# Patient Record
Sex: FEMALE | Race: WHITE | NOT HISPANIC OR LATINO | URBAN - METROPOLITAN AREA
[De-identification: names, ages, dates, MRNs, and addresses within clinical notes are randomized per-mention and may not be internally consistent; named-entity substitution may affect disease eponyms.]

---

## 2018-04-06 ENCOUNTER — OFFICE VISIT CONVERTED (OUTPATIENT)
Dept: FAMILY MEDICINE CLINIC | Facility: CLINIC | Age: 33
End: 2018-04-06
Attending: NURSE PRACTITIONER

## 2018-04-06 ENCOUNTER — CONVERSION ENCOUNTER (OUTPATIENT)
Dept: FAMILY MEDICINE CLINIC | Facility: CLINIC | Age: 33
End: 2018-04-06

## 2018-05-18 ENCOUNTER — OFFICE VISIT CONVERTED (OUTPATIENT)
Dept: FAMILY MEDICINE CLINIC | Facility: CLINIC | Age: 33
End: 2018-05-18
Attending: NURSE PRACTITIONER

## 2020-06-16 ENCOUNTER — OFFICE VISIT CONVERTED (OUTPATIENT)
Dept: FAMILY MEDICINE CLINIC | Facility: CLINIC | Age: 35
End: 2020-06-16
Attending: NURSE PRACTITIONER

## 2020-06-16 ENCOUNTER — HOSPITAL ENCOUNTER (OUTPATIENT)
Dept: FAMILY MEDICINE CLINIC | Facility: CLINIC | Age: 35
Discharge: HOME | End: 2020-06-16
Attending: NURSE PRACTITIONER

## 2020-06-16 LAB
25(OH)D3 SERPL-MCNC: 35.4 NG/ML (ref 30–100)
ALBUMIN SERPL-MCNC: 4.5 G/DL (ref 3.5–5)
ALBUMIN/GLOB SERPL: 1.4 {RATIO} (ref 1.4–2.6)
ALP SERPL-CCNC: 86 U/L (ref 42–98)
ALT SERPL-CCNC: 14 U/L (ref 10–40)
ANION GAP SERPL CALC-SCNC: 17 MMOL/L (ref 8–19)
AST SERPL-CCNC: 18 U/L (ref 15–50)
BASOPHILS # BLD AUTO: 0.09 10*3/UL (ref 0–0.2)
BASOPHILS NFR BLD AUTO: 1.4 % (ref 0–3)
BILIRUB SERPL-MCNC: 0.31 MG/DL (ref 0.2–1.3)
BUN SERPL-MCNC: 10 MG/DL (ref 5–25)
BUN/CREAT SERPL: 15 {RATIO} (ref 6–20)
CALCIUM SERPL-MCNC: 9.8 MG/DL (ref 8.7–10.4)
CHLORIDE SERPL-SCNC: 102 MMOL/L (ref 99–111)
CHOLEST SERPL-MCNC: 180 MG/DL (ref 107–200)
CHOLEST/HDLC SERPL: 3.7 {RATIO} (ref 3–6)
CONV ABS IMM GRAN: 0.01 10*3/UL (ref 0–0.2)
CONV CO2: 24 MMOL/L (ref 22–32)
CONV IMMATURE GRAN: 0.2 % (ref 0–1.8)
CONV TOTAL PROTEIN: 7.7 G/DL (ref 6.3–8.2)
CREAT UR-MCNC: 0.68 MG/DL (ref 0.5–0.9)
DEPRECATED RDW RBC AUTO: 42.2 FL (ref 36.4–46.3)
EOSINOPHIL # BLD AUTO: 0.08 10*3/UL (ref 0–0.7)
EOSINOPHIL # BLD AUTO: 1.2 % (ref 0–7)
ERYTHROCYTE [DISTWIDTH] IN BLOOD BY AUTOMATED COUNT: 14.6 % (ref 11.7–14.4)
GFR SERPLBLD BASED ON 1.73 SQ M-ARVRAT: >60 ML/MIN/{1.73_M2}
GLOBULIN UR ELPH-MCNC: 3.2 G/DL (ref 2–3.5)
GLUCOSE SERPL-MCNC: 87 MG/DL (ref 65–99)
HCT VFR BLD AUTO: 38.8 % (ref 37–47)
HDLC SERPL-MCNC: 49 MG/DL (ref 40–60)
HGB BLD-MCNC: 11.4 G/DL (ref 12–16)
IRON SATN MFR SERPL: 8 % (ref 20–55)
IRON SERPL-MCNC: 33 UG/DL (ref 60–170)
LDLC SERPL CALC-MCNC: 112 MG/DL (ref 70–100)
LYMPHOCYTES # BLD AUTO: 1.17 10*3/UL (ref 1–5)
LYMPHOCYTES NFR BLD AUTO: 17.6 % (ref 20–45)
MCH RBC QN AUTO: 23.4 PG (ref 27–31)
MCHC RBC AUTO-ENTMCNC: 29.4 G/DL (ref 33–37)
MCV RBC AUTO: 79.7 FL (ref 81–99)
MONOCYTES # BLD AUTO: 0.69 10*3/UL (ref 0.2–1.2)
MONOCYTES NFR BLD AUTO: 10.4 % (ref 3–10)
NEUTROPHILS # BLD AUTO: 4.59 10*3/UL (ref 2–8)
NEUTROPHILS NFR BLD AUTO: 69.2 % (ref 30–85)
NRBC CBCN: 0 % (ref 0–0.7)
OSMOLALITY SERPL CALC.SUM OF ELEC: 284 MOSM/KG (ref 273–304)
PLATELET # BLD AUTO: 254 10*3/UL (ref 130–400)
PMV BLD AUTO: 11.5 FL (ref 9.4–12.3)
POTASSIUM SERPL-SCNC: 4.5 MMOL/L (ref 3.5–5.3)
RBC # BLD AUTO: 4.87 10*6/UL (ref 4.2–5.4)
SODIUM SERPL-SCNC: 138 MMOL/L (ref 135–147)
T4 FREE SERPL-MCNC: 1.7 NG/DL (ref 0.9–1.8)
TIBC SERPL-MCNC: 440 UG/DL (ref 245–450)
TRANSFERRIN SERPL-MCNC: 308 MG/DL (ref 250–380)
TRIGL SERPL-MCNC: 93 MG/DL (ref 40–150)
TSH SERPL-ACNC: 2.32 M[IU]/L (ref 0.27–4.2)
VLDLC SERPL-MCNC: 19 MG/DL (ref 5–37)
WBC # BLD AUTO: 6.63 10*3/UL (ref 4.8–10.8)

## 2020-06-17 LAB
CONV ANTI MICROSOMAL AB: 61 IU/ML (ref 0–34)
CONV THYROXINE TOTAL: 11.4 UG/DL (ref 4.5–12)
INSULIN SERPL-ACNC: 16.5 UIU/ML (ref 2.6–24.9)
T3FREE SERPL-MCNC: 2.7 PG/ML (ref 2–4.4)

## 2020-06-19 LAB
CONV ESTROGENS, TOTAL, SERUM: 250 PG/ML
FSH SERPL-ACNC: 2.7 M[IU]/ML
LH SERPL-ACNC: 5.1 M[IU]/ML
PROGEST SERPL-MCNC: 11.1 NG/ML

## 2020-06-22 LAB — CONV HEMOCHROMATOSIS MUTATION (C282Y,H63D,565C): NORMAL

## 2020-06-23 LAB — T3REVERSE SERPL-MCNC: 20.3 NG/DL (ref 9.2–24.1)

## 2020-06-24 LAB
CONV TESTOSTERONE, FREE: 2.9 PG/ML
TESTOST FREE MFR SERPL: 1.2 %
TESTOST SERPL-MCNC: 24 NG/DL

## 2020-07-07 ENCOUNTER — HOSPITAL ENCOUNTER (OUTPATIENT)
Dept: DIABETES SERVICES | Facility: HOSPITAL | Age: 35
Discharge: HOME | End: 2020-07-07
Attending: NURSE PRACTITIONER

## 2021-05-13 NOTE — PROGRESS NOTES
Progress Note      Patient Name: Yesika Madison   Patient ID: [id number]   Sex: Female   YOB: 1985    Primary Care Provider: Kayla SU    Visit Date: June 16, 2020    Provider: GEORGES Garcia   Location: [facility]   Location Address: [address] [place] [address] JIMENEZ Mccarthy  [id number]   Location Phone: [phone]          Chief Complaint     Yearly follow up and lab work       History Of Present Illness  Yesika Madison is a 35 year old /White female who presents for evaluation and treatment of:      physical    hashimotos, she is interested in endocrinology if her labs are abnormal today, she hasn't had labs done in a year.    fatigue, but she hasn't had good sleep habits    she is trying to conceive, had her IUD removed a month ago, LMP was 5/24/20. She is on prenatals.     hx of weight issues, bariatric surgery, insulin resistance, she has gained weight, she would like her hormone levels checked and insulin. She has been off metformin since she had her surgery.     depression has been doing good- she has been worried about her weight and that clouds her mind she says     she had genetic screening done, says she was negative for BRCA mutation but positive for a hemachromatosis mutation. SHe has had elevated hgb when she has given blood in the past.       Past Medical History  Disease Name Date Onset Notes   Anxiety --  --    Depression --  --    Diabetes --  --    Elevated LFTs 06/16/2020 --    Fatty liver --  --    Hashimoto's disease 06/16/2020 --    High cholesterol --  --    Hypothyroidism 06/16/2020 --    Insulin resistance 06/16/2020 --    Overweight 06/16/2020 --    Reflux --  --    Thyroid disorder --  --          Past Surgical History  Procedure Name Date Notes   Gastric Sleeve --  --          Medication List  Name Date Started Instructions   Multi Vitamin 9 mg iron/15 mL oral liquid  --    Synthroid 100 mcg oral tablet 06/27/2019 TAKE ONE  "TABLET BY MOUTH ONE TIME DAILY   Vitamin D-3 with Aloe 120-1,000-10 mg-unit-mg oral tablet  take 1 tablet by oral route         Allergy List  Allergen Name Date Reaction Notes   morphine --  --  --        Allergies Reconciled  Family Medical History  Disease Name Relative/Age Notes   Heart Disease Mother/   --          Reproductive History  Menstrual   Pregnancy Summary   Total Pregnancies: 3 Full Term: 2 Premature: 0   Ab Induced: 0 Ab Spontaneous: 0 Ectopics: 0   Multiples: 0 Livin         Social History  Finding Status Start/Stop Quantity Notes   Alcohol Never --/-- --  --    Exercises regularly --  --/-- --  30 min 5 days a week   Other Substance Use/Abuse --  --/-- --  never   Tobacco Never --/-- --  --          Immunizations  NameDate Admin Mfg Trade Name Lot Number Route Inj VIS Given VIS Publication   Hepatitis [id number] SKB HAVRIX-ADULT [id number] IM LD 2018 10/25/2011   Comments: pt tolerated well, left office in stable condition         Review of Systems  · Constitutional  o Admits  o : weight gain, fatigue  · HENT  o Denies  o : headaches, vertigo, lightheadedness, dysphagia  · Cardiovascular  o Denies  o : lower extremity edema, chest pressure, palpitations  · Respiratory  o Denies  o : shortness of breath, wheezing, cough, dyspnea on exertion  · Gastrointestinal  o Denies  o : nausea, vomiting, diarrhea, constipation, abdominal pain  · Genitourinary  o Denies  o : urgency, frequency, dysuria  · Psychiatric  o Denies  o : anxiety, depression, suicidal ideation, homicidal ideation      Vitals  Date Time BP Position Site L\R Cuff Size HR RR TEMP (F) WT  HT  BMI kg/m2 BSA m2 O2 Sat HC       2018 10:03 /70 Sitting    73 - R 16 97.9 177lbs 2oz 5'  4\" 30.4 1.9 99 %    2018 10:29 /81 Sitting    78 - R 14 98.6 179lbs 1oz 5'  4\" 30.74 1.92 100 %    2020 09:25 /67 Sitting    83 - R 20 98.3 185lbs 3oz 5'  4\" 31.79 1.95 100 %          Physical " Examination  · Constitutional  o Appearance  o : well developed, well-nourished, no acute distress  · Neck  o Inspection/Palpation  o : normal appearance, no masses or tenderness, trachea midline  o Thyroid  o : gland size normal, nontender, no nodules or masses present on palpation  · Respiratory  o Respiratory Effort  o : breathing unlabored  o Inspection of Chest  o : chest rise symmetric bilaterally  o Auscultation of Lungs  o : clear to auscultation bilaterally throughout inspiration and expiration  · Cardiovascular  o Heart  o :   § Auscultation of Heart  § : regular rate and rhythm, no murmurs, gallops or rubs  o Peripheral Vascular System  o :   § Extremities  § : no edema  · Lymphatic  o Neck  o : no cervical lymphadenopathy, no supraclavicular lymphadenopathy  · Psychiatric  o Mood and Affect  o : mood normal, affect appropriate          Assessment  · Annual physical exam     V70.0/Z00.00  · Fatigue     780.79/R53.83  · Hypothyroidism     244.9/E03.9  · Screening for depression     V79.0/Z13.89  · Screening for lipid disorders     V77.91/Z13.220  · Overweight     278.02/E66.3  · Weight gain     783.1/R63.5  · Abnormal laboratory test     796.4/R89.9  · Hashimoto's disease     245.2/E06.3  · Encounter for general counseling and advice on procreation     V26.49/Z31.69  · Insulin resistance     277.7/E88.81  · Elevated LFTs     790.6/R79.89      Plan  · Orders  o Annual depression screening, 15 minutes (, 12047) - V79.0/Z13.89 - 06/16/2020  o ACO-18: Negative screen for clinical depression using a standardized tool () - V79.0/Z13.89 - 06/16/2020  o CBC with Auto Diff [facility] (73781) - - 06/16/2020  o CMP [facility] (68488) - - 06/16/2020  o Hormone Panel (Estrogen, FSH, LH, Progesterone) [facility] (88769, 13638, 00412, 30757) - - 06/16/2020  o Lipid Panel [facility] (22196) - - 06/16/2020  o Thyroid Profile (09264, 48247, THYII) - - 06/16/2020  o Vitamin D (25-Hydroxy) Level (71855) - - 06/16/2020  o ACO-39: Current  medications updated and reviewed () - - 06/16/2020  o Testosterone females or children (00834) - - 06/16/2020  o Insulin (88447) - - 06/16/2020  o T3 (Free) (69673) - - 06/16/2020  o T3 (Reverse) (95077) - - 06/16/2020  o T4 (98605) - - 06/16/2020  o TPO ab titer ser LA (48160) - - 06/16/2020  o Iron Profile (Iron 92613 TIBC 61541 and Transferrin 37167) (IRONP) - - 06/16/2020  o Hemochromatosis (71731) - - 06/16/2020  o New Pt. 30 Min. Moderate Sever (91818) - - 06/16/2020  · Medications  o Medications have been Reconciled  o Transition of Care or Provider Policy  · Instructions  o Reviewed health maintenance flowsheet and updated information. Orders were placed and/or patient's response was documented.  o Depression Screen completed and scanned into the EMR under the designated folder within the patient's documents.  o Today's PHQ-9 result is 4___  o The provider screening met the required time of 15 minutes.  o Patient is taking medications as prescribed and doing well.   o Patient was educated/instructed on their diagnosis, treatment and medications prior to discharge from the clinic today.  o Patient counseled to reduce calorie intake.  o Patient was instructed to exercise regularly.  o Patient instructed to seek medical attention urgently for new or worsening symptoms.  o Call the office with any concerns or questions.  o Minutes spent with patient including greater than 50% in Education/Counseling/Care Coordination.  o Time spent with the patient was minutes, more than 50% face to face.  o will check labs and call with results, will wait to refill the synthroid until we get tsh back. COntinue the prenatals.   o for the weight, work on exercising daily even if just a brisk walk. small meals, low carb low fat per bariatric surgery protocol  · Disposition  o Call or Return if symptoms worsen or persist.  o F/u appt in 6 months            Electronically Signed by: GEORGES Garcia -Author on June  16, 2020 10:20:12 AM   sacrum

## 2021-05-15 VITALS
OXYGEN SATURATION: 100 % | RESPIRATION RATE: 20 BRPM | BODY MASS INDEX: 31.62 KG/M2 | DIASTOLIC BLOOD PRESSURE: 67 MMHG | WEIGHT: 185.19 LBS | TEMPERATURE: 98.3 F | SYSTOLIC BLOOD PRESSURE: 112 MMHG | HEART RATE: 83 BPM | HEIGHT: 64 IN

## 2021-05-16 VITALS
DIASTOLIC BLOOD PRESSURE: 81 MMHG | TEMPERATURE: 98.6 F | OXYGEN SATURATION: 100 % | BODY MASS INDEX: 30.57 KG/M2 | SYSTOLIC BLOOD PRESSURE: 120 MMHG | RESPIRATION RATE: 14 BRPM | HEART RATE: 78 BPM | HEIGHT: 64 IN | WEIGHT: 179.06 LBS

## 2021-05-16 VITALS
HEIGHT: 64 IN | TEMPERATURE: 97.9 F | WEIGHT: 177.12 LBS | BODY MASS INDEX: 30.24 KG/M2 | SYSTOLIC BLOOD PRESSURE: 113 MMHG | HEART RATE: 73 BPM | RESPIRATION RATE: 16 BRPM | OXYGEN SATURATION: 99 % | DIASTOLIC BLOOD PRESSURE: 70 MMHG